# Patient Record
Sex: FEMALE | Race: WHITE | NOT HISPANIC OR LATINO | URBAN - METROPOLITAN AREA
[De-identification: names, ages, dates, MRNs, and addresses within clinical notes are randomized per-mention and may not be internally consistent; named-entity substitution may affect disease eponyms.]

---

## 2017-11-02 ENCOUNTER — OFFICE (OUTPATIENT)
Dept: URBAN - METROPOLITAN AREA CLINIC 14 | Facility: CLINIC | Age: 52
End: 2017-11-02
Payer: COMMERCIAL

## 2017-11-02 VITALS
DIASTOLIC BLOOD PRESSURE: 77 MMHG | WEIGHT: 174 LBS | HEART RATE: 61 BPM | HEIGHT: 67 IN | SYSTOLIC BLOOD PRESSURE: 134 MMHG

## 2017-11-02 DIAGNOSIS — K52.89 OTHER SPECIFIED NONINFECTIVE GASTROENTERITIS AND COLITIS: ICD-10-CM

## 2017-11-02 DIAGNOSIS — R63.4 ABNORMAL WEIGHT LOSS: ICD-10-CM

## 2017-11-02 DIAGNOSIS — E66.3 OVERWEIGHT: ICD-10-CM

## 2017-11-02 DIAGNOSIS — E11.9 TYPE 2 DIABETES MELLITUS WITHOUT COMPLICATIONS: ICD-10-CM

## 2017-11-02 DIAGNOSIS — R19.7 DIARRHEA, UNSPECIFIED: ICD-10-CM

## 2017-11-02 PROCEDURE — 99244 OFF/OP CNSLTJ NEW/EST MOD 40: CPT | Performed by: NURSE PRACTITIONER

## 2017-11-02 RX ORDER — SODIUM PICOSULFATE, MAGNESIUM OXIDE, AND ANHYDROUS CITRIC ACID 10; 3.5; 12 MG/16.1G; G/16.1G; G/16.1G
POWDER, METERED ORAL
Qty: 1 | Refills: 0 | Status: COMPLETED
Start: 2017-11-02 | End: 2017-11-10

## 2017-11-10 ENCOUNTER — OFFICE (OUTPATIENT)
Dept: URBAN - METROPOLITAN AREA CLINIC 11 | Facility: CLINIC | Age: 52
End: 2017-11-10
Payer: COMMERCIAL

## 2017-11-10 ENCOUNTER — AMBULATORY SURGICAL CENTER (OUTPATIENT)
Dept: URBAN - METROPOLITAN AREA SURGERY 2 | Facility: SURGERY | Age: 52
End: 2017-11-10
Payer: COMMERCIAL

## 2017-11-10 VITALS
SYSTOLIC BLOOD PRESSURE: 107 MMHG | OXYGEN SATURATION: 95 % | OXYGEN SATURATION: 95 % | RESPIRATION RATE: 20 BRPM | HEART RATE: 65 BPM | WEIGHT: 170 LBS | TEMPERATURE: 97.8 F | SYSTOLIC BLOOD PRESSURE: 107 MMHG | RESPIRATION RATE: 18 BRPM | HEART RATE: 67 BPM | SYSTOLIC BLOOD PRESSURE: 90 MMHG | WEIGHT: 170 LBS | HEIGHT: 67 IN | SYSTOLIC BLOOD PRESSURE: 98 MMHG | RESPIRATION RATE: 18 BRPM | RESPIRATION RATE: 16 BRPM | HEART RATE: 65 BPM | HEART RATE: 70 BPM | SYSTOLIC BLOOD PRESSURE: 108 MMHG | DIASTOLIC BLOOD PRESSURE: 58 MMHG | HEART RATE: 66 BPM | HEART RATE: 67 BPM | HEIGHT: 67 IN | HEART RATE: 66 BPM | DIASTOLIC BLOOD PRESSURE: 63 MMHG | SYSTOLIC BLOOD PRESSURE: 98 MMHG | HEART RATE: 70 BPM | TEMPERATURE: 97.9 F | SYSTOLIC BLOOD PRESSURE: 108 MMHG | RESPIRATION RATE: 20 BRPM | DIASTOLIC BLOOD PRESSURE: 58 MMHG | RESPIRATION RATE: 16 BRPM | TEMPERATURE: 97.8 F | SYSTOLIC BLOOD PRESSURE: 90 MMHG | DIASTOLIC BLOOD PRESSURE: 54 MMHG | DIASTOLIC BLOOD PRESSURE: 63 MMHG | TEMPERATURE: 97.9 F | DIASTOLIC BLOOD PRESSURE: 54 MMHG

## 2017-11-10 DIAGNOSIS — K57.30 DIVERTICULOSIS OF LARGE INTESTINE WITHOUT PERFORATION OR ABS: ICD-10-CM

## 2017-11-10 DIAGNOSIS — R63.4 ABNORMAL WEIGHT LOSS: ICD-10-CM

## 2017-11-10 DIAGNOSIS — R19.7 DIARRHEA, UNSPECIFIED: ICD-10-CM

## 2017-11-10 PROCEDURE — 45380 COLONOSCOPY AND BIOPSY: CPT | Performed by: INTERNAL MEDICINE

## 2017-11-10 PROCEDURE — 88342 IMHCHEM/IMCYTCHM 1ST ANTB: CPT | Performed by: INTERNAL MEDICINE

## 2017-11-10 PROCEDURE — 88305 TISSUE EXAM BY PATHOLOGIST: CPT | Performed by: INTERNAL MEDICINE

## 2019-08-12 ENCOUNTER — OFFICE (OUTPATIENT)
Dept: URBAN - METROPOLITAN AREA CLINIC 14 | Facility: CLINIC | Age: 54
End: 2019-08-12

## 2019-08-12 VITALS
SYSTOLIC BLOOD PRESSURE: 135 MMHG | HEIGHT: 67 IN | HEART RATE: 83 BPM | WEIGHT: 197 LBS | DIASTOLIC BLOOD PRESSURE: 82 MMHG

## 2019-08-12 DIAGNOSIS — R19.7 DIARRHEA, UNSPECIFIED: ICD-10-CM

## 2019-08-12 PROCEDURE — 99215 OFFICE O/P EST HI 40 MIN: CPT | Performed by: NURSE PRACTITIONER

## 2019-08-12 RX ORDER — CHOLESTYRAMINE 4 G/5G
8 POWDER, FOR SUSPENSION ORAL
Qty: 180 | Refills: 1 | Status: ACTIVE
Start: 2019-08-12 | End: 2021-02-12

## 2019-09-25 ENCOUNTER — OFFICE (OUTPATIENT)
Dept: URBAN - METROPOLITAN AREA CLINIC 14 | Facility: CLINIC | Age: 54
End: 2019-09-25

## 2019-09-25 VITALS
WEIGHT: 196 LBS | SYSTOLIC BLOOD PRESSURE: 149 MMHG | HEART RATE: 80 BPM | DIASTOLIC BLOOD PRESSURE: 89 MMHG | HEIGHT: 67 IN | RESPIRATION RATE: 18 BRPM

## 2019-09-25 DIAGNOSIS — R19.7 DIARRHEA, UNSPECIFIED: ICD-10-CM

## 2019-09-25 PROCEDURE — 99213 OFFICE O/P EST LOW 20 MIN: CPT | Performed by: NURSE PRACTITIONER

## 2020-06-01 VITALS — HEIGHT: 67 IN

## 2020-06-03 ENCOUNTER — OFFICE (OUTPATIENT)
Dept: URBAN - METROPOLITAN AREA TELEHEALTH 10 | Facility: TELEHEALTH | Age: 55
End: 2020-06-03

## 2020-06-03 DIAGNOSIS — K21.9 GASTRO-ESOPHAGEAL REFLUX DISEASE WITHOUT ESOPHAGITIS: ICD-10-CM

## 2020-06-03 DIAGNOSIS — R19.7 DIARRHEA, UNSPECIFIED: ICD-10-CM

## 2020-06-03 RX ORDER — CHOLESTYRAMINE 4 G/5G
8 POWDER, FOR SUSPENSION ORAL
Qty: 180 | Refills: 1 | Status: ACTIVE
Start: 2019-08-12 | End: 2021-02-12

## 2020-06-03 NOTE — SERVICEHPINOTES
Nazia Garzon   is a   54   year old  female   here today for follow-up due to chronic diarrhea as well as history of reflux.  Her chronic diarrhea is very well controlled with cholestyramine.  Her reflux symptoms are well controlled with OTC Nexium.  She denies melena, hematochezia, or dysphagia or weight loss.

## 2025-07-30 ENCOUNTER — OFFICE (OUTPATIENT)
Dept: URBAN - METROPOLITAN AREA CLINIC 11 | Facility: CLINIC | Age: 60
End: 2025-07-30
Payer: COMMERCIAL

## 2025-07-30 VITALS
SYSTOLIC BLOOD PRESSURE: 124 MMHG | DIASTOLIC BLOOD PRESSURE: 85 MMHG | WEIGHT: 172 LBS | OXYGEN SATURATION: 98 % | HEART RATE: 77 BPM | HEIGHT: 67 IN

## 2025-07-30 DIAGNOSIS — R63.4 ABNORMAL WEIGHT LOSS: ICD-10-CM

## 2025-07-30 DIAGNOSIS — R14.0 ABDOMINAL DISTENSION (GASEOUS): ICD-10-CM

## 2025-07-30 DIAGNOSIS — R19.7 DIARRHEA, UNSPECIFIED: ICD-10-CM

## 2025-07-30 PROCEDURE — 99204 OFFICE O/P NEW MOD 45 MIN: CPT | Performed by: NURSE PRACTITIONER

## 2025-07-30 NOTE — SERVICEHPINOTES
Nazia Garzon   is a   59   year old  female   with abdominal pain, diarrhea, nausea, and vomiting. She was referred by Lali Villa, nurse practitioner, for evaluation of abdominal pain, diarrhea, nausea, and vomiting.She has experienced persistent and severe diarrhea since June 13th, occurring six to eight times daily with no formed stools, necessitating the use of adult diapers at times. This has resulted in a weight loss of over 20 pounds. Previous treatments with gentamicin and another unspecified antibiotic were ineffective. A recent urinary test confirmed the presence of E. coli, resistant to treatment, and she has been referred to an infectious disease specialist for further evaluation.She experienced vomiting and fever over the holiday weekend, accompanied by severe chills and sweating. The vomiting resolved around July 5th. She currently feels very dizzy and cannot tolerate heat without feeling sick.Her past medical history includes elevated liver enzymes in early July, which normalized after discontinuing Zetia and rosuvastatin. She currently takes Repatha. She has a history of elevated A1c, which was 7.0 at the last check, down from 6.4 in December. She also has a history of gallbladder removal over six years ago and has experienced bile acid diarrhea post-surgery, which was managed with Questran.No current reflux issues but she takes prescription Nexium. She reports significant bloating and burping, previously managed with Gas-X. No current vomiting or reflux issues. She denies any previous episodes of diarrhea like the current one.

## 2025-07-31 LAB
C-REACTIVE PROTEIN, QUANT: 4 MG/L (ref 0–10)
TSH: 0.83 UIU/ML (ref 0.45–4.5)

## 2025-08-04 LAB
